# Patient Record
Sex: MALE | Race: WHITE | NOT HISPANIC OR LATINO
[De-identification: names, ages, dates, MRNs, and addresses within clinical notes are randomized per-mention and may not be internally consistent; named-entity substitution may affect disease eponyms.]

---

## 2020-01-23 ENCOUNTER — APPOINTMENT (OUTPATIENT)
Dept: PLASTIC SURGERY | Facility: CLINIC | Age: 55
End: 2020-01-23
Payer: MEDICARE

## 2020-01-23 VITALS — WEIGHT: 205 LBS | BODY MASS INDEX: 30.36 KG/M2 | HEIGHT: 69 IN

## 2020-01-23 DIAGNOSIS — Z86.79 PERSONAL HISTORY OF OTHER DISEASES OF THE CIRCULATORY SYSTEM: ICD-10-CM

## 2020-01-23 DIAGNOSIS — M24.542 CONTRACTURE, LEFT HAND: ICD-10-CM

## 2020-01-23 DIAGNOSIS — G40.909 EPILEPSY, UNSPECIFIED, NOT INTRACTABLE, W/OUT STATUS EPILEPTICUS: ICD-10-CM

## 2020-01-23 DIAGNOSIS — Z86.39 PERSONAL HISTORY OF OTHER ENDOCRINE, NUTRITIONAL AND METABOLIC DISEASE: ICD-10-CM

## 2020-01-23 DIAGNOSIS — Z78.9 OTHER SPECIFIED HEALTH STATUS: ICD-10-CM

## 2020-01-23 DIAGNOSIS — Z86.59 PERSONAL HISTORY OF OTHER MENTAL AND BEHAVIORAL DISORDERS: ICD-10-CM

## 2020-01-23 DIAGNOSIS — F31.70 BIPOLAR DISORDER, CURRENTLY IN REMISSION, MOST RECENT EPISODE UNSPECIFIED: ICD-10-CM

## 2020-01-23 PROCEDURE — 99203 OFFICE O/P NEW LOW 30 MIN: CPT

## 2020-01-23 RX ORDER — LISINOPRIL 2.5 MG/1
2.5 TABLET ORAL
Refills: 0 | Status: ACTIVE | COMMUNITY

## 2020-01-23 RX ORDER — SERTRALINE HYDROCHLORIDE 50 MG/1
50 TABLET, FILM COATED ORAL
Refills: 0 | Status: ACTIVE | COMMUNITY

## 2020-01-23 RX ORDER — ATORVASTATIN CALCIUM 20 MG/1
20 TABLET, FILM COATED ORAL
Refills: 0 | Status: ACTIVE | COMMUNITY

## 2020-01-23 RX ORDER — RISPERIDONE 4 MG/1
4 TABLET, ORALLY DISINTEGRATING ORAL
Refills: 0 | Status: ACTIVE | COMMUNITY

## 2020-01-23 RX ORDER — HALOPERIDOL DECANOATE 100 MG/ML
100 INJECTION INTRAMUSCULAR
Refills: 0 | Status: ACTIVE | COMMUNITY

## 2020-01-23 RX ORDER — BENZTROPINE MESYLATE 2 MG/1
2 TABLET ORAL
Refills: 0 | Status: ACTIVE | COMMUNITY

## 2020-01-23 RX ORDER — BISACODYL 5 MG/1
5 TABLET, DELAYED RELEASE ORAL
Refills: 0 | Status: ACTIVE | COMMUNITY

## 2020-01-23 RX ORDER — TOPIRAMATE 25 MG/1
25 TABLET, FILM COATED ORAL
Refills: 0 | Status: ACTIVE | COMMUNITY

## 2020-01-23 RX ORDER — FENOFIBRATE 120 MG/1
TABLET ORAL
Refills: 0 | Status: ACTIVE | COMMUNITY

## 2020-01-23 RX ORDER — LEVOTHYROXINE SODIUM 0.1 MG/1
100 TABLET ORAL
Refills: 0 | Status: ACTIVE | COMMUNITY

## 2020-01-23 NOTE — PHYSICAL EXAM
[de-identified] : well-developed male, NAD [de-identified] : NC/AT [de-identified] : PERRL [de-identified] : supple [de-identified] : good inspiratory effort  [de-identified] : DAVIDR [de-identified] : soft, nontender  [de-identified] : Left hand with significant flexion contractures at MP/PIP joints of digits 4 and 5, positive tabletop test, no palmar nodules or fibrosis, no open wounds or sensory deficits

## 2020-01-23 NOTE — HISTORY OF PRESENT ILLNESS
[FreeTextEntry1] : 53 yo M with PMH of HTN, Bipolar, Schizoaffective disorder, Epilepsy and Hypothyroid who is a resident at [facility] and presents today for evaluation of hand contracture. Patient is RHD and presents c/o left hand contracture mainly digits 4 and 5 of unknown duration. No prior hand studies, injections or procedure. Denies any trauma, finger numbness or tingling. \par \par disabled

## 2020-02-13 ENCOUNTER — APPOINTMENT (OUTPATIENT)
Dept: PLASTIC SURGERY | Facility: CLINIC | Age: 55
End: 2020-02-13
Payer: MEDICARE

## 2020-02-13 PROCEDURE — 99212 OFFICE O/P EST SF 10 MIN: CPT

## 2020-02-13 NOTE — ASSESSMENT
[FreeTextEntry1] : Reason For Visit\par THERESA GOLDBERG is being seen for an initial evaluation. \par Patient accompanied by health aid. \par  \par History of Present Illness\par 55 yo M with PMH of HTN, Bipolar, Schizoaffective disorder, Epilepsy and Hypothyroid who is a resident at [facility] and presents today for evaluation of hand contracture. Patient is RHD and presents c/o left hand contracture mainly digits 4 and 5 of unknown duration. No prior hand studies, injections or procedure. Denies any trauma, finger numbness or tingling. \par \par disabled \par  \par Past Medical History\par History of Epilepsy (345.90) (G40.909)\par History of depressed bipolar disorder (296.56) (F31.70)\par History of hypertension (V12.59) (Z86.79)\par History of hypothyroidism (V12.29) (Z86.39)\par History of schizoaffective disorder (V11.0) (Z86.59)\par \par Surgical History\par No history of surgery\par \par Social History\par No alcohol use\par No illicit drug use\par Non-smoker (V49.89) (Z78.9)\par \par Allergies\par No Known Drug Allergies\par \par Review of Systems\par \par Musculoskeletal: as noted in HPI. \par Psychiatric: as noted in HPI. \par Constitutional, Eyes, ENT, Cardiovascular, Respiratory, Gastrointestinal, Genitourinary, Integumentary, Neurological, Endocrine and Heme/Lymph are otherwise negative. \par  \par Vitals\par Vital Signs \par 	Recorded: 23Jan2020 10:30AM\par Height	5 ft 9 in\par Weight	205 lb \par BMI Calculated	30.27\par BSA Calculated	2.09\par \par Physical Exam\par \par Constitutional: well-developed male, NAD \par Head: NC/AT \par Eyes: PERRL \par Neck: supple \par Pulmonary: good inspiratory effort \par Cardiovascular: RRR \par Gastrointestinal: soft, nontender \par Extremities: Left hand with significant flexion contractures at MP/PIP joints of digits 4 and 5, positive tabletop test, no palmar nodules or fibrosis, no open wounds or sensory deficits \par  \par Assessment\par Contracture of joint of left hand (718.44) (M24.542)\par \par 55 yo M with left hand Dupuytren's contracture of digits 4 and 5. \par \par - hand X-Ray\par - f/u after study to discuss surgical release and treatment plan with Dr. Shrestha. \par \par \par \par as above\par xrays reviewed\par left hand contracture 4+5\par most c/w ulnar neuropathy\par \par suggest B/L UE EMG/EDS\par \par f/u thereafter\par NOT Dupuytrens

## 2020-08-18 ENCOUNTER — APPOINTMENT (OUTPATIENT)
Dept: PLASTIC SURGERY | Facility: CLINIC | Age: 55
End: 2020-08-18

## 2020-09-18 ENCOUNTER — APPOINTMENT (OUTPATIENT)
Dept: PLASTIC SURGERY | Facility: CLINIC | Age: 55
End: 2020-09-18
Payer: MEDICARE

## 2020-09-18 PROCEDURE — 99212 OFFICE O/P EST SF 10 MIN: CPT

## 2020-09-18 NOTE — ASSESSMENT
[FreeTextEntry1] : Reason For Visit\par THERESA GOLDBERG is being seen for an initial evaluation. \par Patient accompanied by health aid. \par  \par History of Present Illness\par 55 yo M with PMH of HTN, Bipolar, Schizoaffective disorder, Epilepsy and Hypothyroid who is a resident at [facility] and presents today for evaluation of hand contracture. Patient is RHD and presents c/o left hand contracture mainly digits 4 and 5 of unknown duration. No prior hand studies, injections or procedure. Denies any trauma, finger numbness or tingling. \par \par disabled \par  \par Past Medical History\par History of Epilepsy (345.90) (G40.909)\par History of depressed bipolar disorder (296.56) (F31.70)\par History of hypertension (V12.59) (Z86.79)\par History of hypothyroidism (V12.29) (Z86.39)\par History of schizoaffective disorder (V11.0) (Z86.59)\par \par Surgical History\par No history of surgery\par \par Social History\par No alcohol use\par No illicit drug use\par Non-smoker (V49.89) (Z78.9)\par \par Allergies\par No Known Drug Allergies\par \par Review of Systems\par \par Musculoskeletal: as noted in HPI. \par Psychiatric: as noted in HPI. \par Constitutional, Eyes, ENT, Cardiovascular, Respiratory, Gastrointestinal, Genitourinary, Integumentary, Neurological, Endocrine and Heme/Lymph are otherwise negative. \par  \par Vitals\par Vital Signs \par 	Recorded: 23Jan2020 10:30AM\par Height	5 ft 9 in\par Weight	205 lb \par BMI Calculated	30.27\par BSA Calculated	2.09\par \par Physical Exam\par \par Constitutional: well-developed male, NAD \par Head: NC/AT \par Eyes: PERRL \par Neck: supple \par Pulmonary: good inspiratory effort \par Cardiovascular: RRR \par Gastrointestinal: soft, nontender \par Extremities: Left hand with significant flexion contractures at MP/PIP joints of digits 4 and 5, positive tabletop test, no palmar nodules or fibrosis, no open wounds or sensory deficits \par  \par Assessment\par Contracture of joint of left hand (718.44) (M24.542)\par \par 55 yo M with left hand Dupuytren's contracture of digits 4 and 5. \par \par - hand X-Ray\par - f/u after study to discuss surgical release and treatment plan with Dr. Shrestha. \par \par \par \par as above\par xrays reviewed\par left hand contracture 4+5\par most c/w ulnar neuropathy\par \par suggest B/L UE EMG/EDS\par \par f/u thereafter\par NOT Dupuytrens\par \par as above\par EMG reviewed:  severe ulnar neuropathy at bilateral elbows\par \par needs B/L cubital tunnel release\par \par Regarding the hand surgery, we discussed the risk of bleeding, infection, need for additional unplanned surgery, need for postoperative hand occupational therapy, possible lack of improvement and diminished hand function.  We discussed prolonged recovery.  All questions were answered and all risks were well understood by the patient.\par \par Due to severe deficit already present the benefits from surgery will primarily be prevention of worsening of sxs.  Small chance of meaningful improvement in ulnar motor wekaness of hands, including clawing\par Will nee Hand OT and splinting thereafter.

## 2020-10-09 NOTE — ASU PATIENT PROFILE, ADULT - VISION (WITH CORRECTIVE LENSES IF THE PATIENT USUALLY WEARS THEM):
For insurance purposes, please a submit a service to [facility] dated 2019. Patient is scheduled with Corinne Taveras on 1/14/19. Any questions please call our office at [phone].  Thank you!  
Updated service to Nutrition entered.   
Normal vision: sees adequately in most situations; can see medication labels, newsprint

## 2020-10-12 ENCOUNTER — APPOINTMENT (OUTPATIENT)
Dept: PLASTIC SURGERY | Facility: AMBULATORY SURGERY CENTER | Age: 55
End: 2020-10-12
Payer: MEDICARE

## 2020-10-12 ENCOUNTER — OUTPATIENT (OUTPATIENT)
Dept: OUTPATIENT SERVICES | Facility: HOSPITAL | Age: 55
LOS: 1 days | Discharge: HOME | End: 2020-10-12

## 2020-10-12 VITALS
DIASTOLIC BLOOD PRESSURE: 70 MMHG | TEMPERATURE: 98 F | RESPIRATION RATE: 16 BRPM | OXYGEN SATURATION: 99 % | HEART RATE: 70 BPM | SYSTOLIC BLOOD PRESSURE: 136 MMHG

## 2020-10-12 VITALS
RESPIRATION RATE: 18 BRPM | OXYGEN SATURATION: 100 % | HEIGHT: 69 IN | TEMPERATURE: 98 F | DIASTOLIC BLOOD PRESSURE: 70 MMHG | SYSTOLIC BLOOD PRESSURE: 116 MMHG | WEIGHT: 209 LBS | HEART RATE: 75 BPM

## 2020-10-12 PROCEDURE — 64718 REVISE ULNAR NERVE AT ELBOW: CPT | Mod: 50

## 2020-10-12 RX ORDER — BENZTROPINE MESYLATE 1 MG
1 TABLET ORAL
Qty: 0 | Refills: 0 | DISCHARGE

## 2020-10-12 RX ORDER — MAGNESIUM HYDROXIDE 400 MG/1
0 TABLET, CHEWABLE ORAL
Qty: 0 | Refills: 0 | DISCHARGE

## 2020-10-12 RX ORDER — RISPERIDONE 4 MG/1
1 TABLET ORAL
Qty: 0 | Refills: 0 | DISCHARGE

## 2020-10-12 RX ORDER — TOPIRAMATE 25 MG
1 TABLET ORAL
Qty: 0 | Refills: 0 | DISCHARGE

## 2020-10-12 RX ORDER — SODIUM CHLORIDE 9 MG/ML
1000 INJECTION, SOLUTION INTRAVENOUS
Refills: 0 | Status: DISCONTINUED | OUTPATIENT
Start: 2020-10-12 | End: 2020-10-26

## 2020-10-12 RX ORDER — TRAMADOL HYDROCHLORIDE 50 MG/1
50 TABLET, COATED ORAL
Qty: 10 | Refills: 0 | Status: ACTIVE | COMMUNITY
Start: 2020-10-12 | End: 1900-01-01

## 2020-10-12 RX ORDER — LISINOPRIL 2.5 MG/1
1 TABLET ORAL
Qty: 0 | Refills: 0 | DISCHARGE

## 2020-10-12 RX ORDER — TRAMADOL HYDROCHLORIDE 50 MG/1
1 TABLET ORAL
Qty: 10 | Refills: 0
Start: 2020-10-12 | End: 2020-10-14

## 2020-10-12 RX ORDER — ONDANSETRON 8 MG/1
4 TABLET, FILM COATED ORAL ONCE
Refills: 0 | Status: DISCONTINUED | OUTPATIENT
Start: 2020-10-12 | End: 2020-10-26

## 2020-10-12 RX ORDER — OXYCODONE AND ACETAMINOPHEN 5; 325 MG/1; MG/1
1 TABLET ORAL EVERY 4 HOURS
Refills: 0 | Status: DISCONTINUED | OUTPATIENT
Start: 2020-10-12 | End: 2020-10-12

## 2020-10-12 RX ORDER — MOXIFLOXACIN HYDROCHLORIDE TABLETS, 400 MG 400 MG/1
1 TABLET, FILM COATED ORAL
Qty: 0 | Refills: 0 | DISCHARGE

## 2020-10-12 RX ORDER — CEPHALEXIN 500 MG/1
500 CAPSULE ORAL 4 TIMES DAILY
Qty: 12 | Refills: 0 | Status: ACTIVE | COMMUNITY
Start: 2020-10-12 | End: 1900-01-01

## 2020-10-12 RX ORDER — HALOPERIDOL DECANOATE 100 MG/ML
0 INJECTION INTRAMUSCULAR
Qty: 0 | Refills: 0 | DISCHARGE

## 2020-10-12 RX ORDER — FENOFIBRATE,MICRONIZED 130 MG
1 CAPSULE ORAL
Qty: 0 | Refills: 0 | DISCHARGE

## 2020-10-12 RX ORDER — LEVOTHYROXINE SODIUM 125 MCG
1 TABLET ORAL
Qty: 0 | Refills: 0 | DISCHARGE

## 2020-10-12 RX ORDER — HYDROMORPHONE HYDROCHLORIDE 2 MG/ML
0.5 INJECTION INTRAMUSCULAR; INTRAVENOUS; SUBCUTANEOUS
Refills: 0 | Status: DISCONTINUED | OUTPATIENT
Start: 2020-10-12 | End: 2020-10-12

## 2020-10-12 RX ORDER — CEPHALEXIN 500 MG
1 CAPSULE ORAL
Qty: 12 | Refills: 0
Start: 2020-10-12 | End: 2020-10-14

## 2020-10-12 RX ORDER — ATORVASTATIN CALCIUM 80 MG/1
1 TABLET, FILM COATED ORAL
Qty: 0 | Refills: 0 | DISCHARGE

## 2020-10-12 RX ORDER — SERTRALINE 25 MG/1
1 TABLET, FILM COATED ORAL
Qty: 0 | Refills: 0 | DISCHARGE

## 2020-10-12 RX ORDER — ACETAMINOPHEN 500 MG
0 TABLET ORAL
Qty: 0 | Refills: 0 | DISCHARGE

## 2020-10-12 RX ADMIN — SODIUM CHLORIDE 100 MILLILITER(S): 9 INJECTION, SOLUTION INTRAVENOUS at 11:59

## 2020-10-12 NOTE — ASU DISCHARGE PLAN (ADULT/PEDIATRIC) - CALL YOUR DOCTOR IF YOU HAVE ANY OF THE FOLLOWING:
Numbness, tingling, color or temperature change to extremity/Pain not relieved by Medications/Fever greater than (need to indicate Fahrenheit or Celsius)/Swelling that gets worse/Bleeding that does not stop

## 2020-10-12 NOTE — BRIEF OPERATIVE NOTE - NSEVIDENCEINFORABS_GEN_ALL_CORE
Spinal    Diagnosis: R HIP  Patient location during procedure: holding area  Start time: 5/21/2020 10:06 AM  Timeout: 5/21/2020 10:06 AM  End time: 5/21/2020 10:10 AM    Staffing  Authorizing Provider: Avila Bullard MD  Performing Provider: Avila Bullard MD    Preanesthetic Checklist  Completed: patient identified, site marked, surgical consent, pre-op evaluation, timeout performed, IV checked, risks and benefits discussed and monitors and equipment checked  Spinal Block  Patient position: sitting  Prep: ChloraPrep  Patient monitoring: heart rate, cardiac monitor, continuous pulse ox and frequent blood pressure checks  Approach: right paramedian  Location: L3-4  Injection technique: single shot  CSF Fluid: clear free-flowing CSF  Needle  Needle type: pencil-tip   Needle gauge: 24 G  Needle length: 3.5 in  Additional Documentation: incremental injection, negative aspiration for heme and no paresthesia on injection  Needle localization: anatomical landmarks  Assessment  Sensory level: T6   Dermatomal levels determined by alcohol wipe and pinch or prick  Ease of block: easy  Patient's tolerance of the procedure: comfortable throughout block and no complaints          
No

## 2020-10-12 NOTE — CHART NOTE - NSCHARTNOTEFT_GEN_A_CORE
PACU ANESTHESIA ADMISSION NOTE      ____  Intubated  TV:______       Rate: ______      FiO2: ______    __x__  Patent Airway    __x__  Full return of protective reflexes    __x__  Full recovery from anesthesia / back to baseline status    Vitals:  T(C): 36.7 (10-12-20 @ 10:34), Max: 36.7 (10-12-20 @ 10:10)  HR: 75 (10-12-20 @ 10:34) (75 - 75)  BP: 116/70 (10-12-20 @ 10:34) (116/70 - 116/70)  RR: 18 (10-12-20 @ 10:34) (18 - 18)  SpO2: 100% (10-12-20 @ 10:34) (100% - 100%)    Mental Status:  __x__ Awake   ___x__ Alert   _____ Drowsy   _____ Sedated    Nausea/Vomiting:  __x__ NO  ______Yes,   See Post - Op Orders          Pain Scale (0-10):  __0___    Treatment: ____ None    __x__ See Post - Op/PCA Orders    Post - Operative Fluids:   ____ Oral   __x__ See Post - Op Orders    Plan: Discharge:   _x___Home       _____Floor     _____Critical Care    _____  Other:_________________    Comments: Patient had smooth intraoperative event, no anesthesia complication.  PACU Vital signs: HR:  67          BP:  117      / 58         RR: 16            O2 Sat:  97     %     Temp 36

## 2020-10-12 NOTE — ASU DISCHARGE PLAN (ADULT/PEDIATRIC) - ASU DC SPECIAL INSTRUCTIONSFT
Keep surgical site clean and dry.   Do not remove any dressings or get them wet.   Rest, no lifting.

## 2020-10-12 NOTE — ASU DISCHARGE PLAN (ADULT/PEDIATRIC) - PAIN MANAGEMENT
Keflex, Tramadol for pain as needed/Prescriptions electronically submitted to pharmacy from [facility]

## 2020-10-15 DIAGNOSIS — G40.909 EPILEPSY, UNSPECIFIED, NOT INTRACTABLE, WITHOUT STATUS EPILEPTICUS: ICD-10-CM

## 2020-10-15 DIAGNOSIS — E03.9 HYPOTHYROIDISM, UNSPECIFIED: ICD-10-CM

## 2020-10-15 DIAGNOSIS — F31.9 BIPOLAR DISORDER, UNSPECIFIED: ICD-10-CM

## 2020-10-15 DIAGNOSIS — E78.00 PURE HYPERCHOLESTEROLEMIA, UNSPECIFIED: ICD-10-CM

## 2020-10-15 DIAGNOSIS — I10 ESSENTIAL (PRIMARY) HYPERTENSION: ICD-10-CM

## 2020-10-15 DIAGNOSIS — F03.90 UNSPECIFIED DEMENTIA WITHOUT BEHAVIORAL DISTURBANCE: ICD-10-CM

## 2020-10-15 DIAGNOSIS — G56.23 LESION OF ULNAR NERVE, BILATERAL UPPER LIMBS: ICD-10-CM

## 2020-10-19 ENCOUNTER — APPOINTMENT (OUTPATIENT)
Dept: PLASTIC SURGERY | Facility: CLINIC | Age: 55
End: 2020-10-19
Payer: MEDICARE

## 2020-10-19 PROBLEM — F32.9 MAJOR DEPRESSIVE DISORDER, SINGLE EPISODE, UNSPECIFIED: Chronic | Status: ACTIVE | Noted: 2020-10-12

## 2020-10-19 PROBLEM — E78.00 PURE HYPERCHOLESTEROLEMIA, UNSPECIFIED: Chronic | Status: ACTIVE | Noted: 2020-10-12

## 2020-10-19 PROBLEM — R13.10 DYSPHAGIA, UNSPECIFIED: Chronic | Status: ACTIVE | Noted: 2020-10-12

## 2020-10-19 PROBLEM — F41.9 ANXIETY DISORDER, UNSPECIFIED: Chronic | Status: ACTIVE | Noted: 2020-10-12

## 2020-10-19 PROBLEM — F31.9 BIPOLAR DISORDER, UNSPECIFIED: Chronic | Status: ACTIVE | Noted: 2020-10-12

## 2020-10-19 PROBLEM — F03.90 UNSPECIFIED DEMENTIA, UNSPECIFIED SEVERITY, WITHOUT BEHAVIORAL DISTURBANCE, PSYCHOTIC DISTURBANCE, MOOD DISTURBANCE, AND ANXIETY: Chronic | Status: ACTIVE | Noted: 2020-10-12

## 2020-10-19 PROBLEM — J18.9 PNEUMONIA, UNSPECIFIED ORGANISM: Chronic | Status: ACTIVE | Noted: 2020-10-12

## 2020-10-19 PROBLEM — I10 ESSENTIAL (PRIMARY) HYPERTENSION: Chronic | Status: ACTIVE | Noted: 2020-10-12

## 2020-10-19 PROBLEM — G40.909 EPILEPSY, UNSPECIFIED, NOT INTRACTABLE, WITHOUT STATUS EPILEPTICUS: Chronic | Status: ACTIVE | Noted: 2020-10-12

## 2020-10-19 PROBLEM — G93.41 METABOLIC ENCEPHALOPATHY: Chronic | Status: ACTIVE | Noted: 2020-10-12

## 2020-10-19 PROBLEM — E03.9 HYPOTHYROIDISM, UNSPECIFIED: Chronic | Status: ACTIVE | Noted: 2020-10-12

## 2020-10-19 PROBLEM — H26.9 UNSPECIFIED CATARACT: Chronic | Status: ACTIVE | Noted: 2020-10-12

## 2020-10-19 PROCEDURE — 99024 POSTOP FOLLOW-UP VISIT: CPT

## 2020-10-19 NOTE — ASSESSMENT
[FreeTextEntry1] : 55 yo M with left hand contracture of digits 4 and 5 and ulnar neuropathy at elbow now POD#7 s/p bl release of cubital tunnel. Doing well. \par \par - dressings changed \par - post-op instructions discussed\par - all questions answered \par - f/u next week for suture removal \par \par

## 2020-10-19 NOTE — HISTORY OF PRESENT ILLNESS
[FreeTextEntry1] : 53 yo M with PMH of HTN, Bipolar, Schizoaffective disorder, Epilepsy and Hypothyroid who is a resident at [facility] and presents today for evaluation of hand contracture. Patient is RHD and presents c/o left hand contracture mainly digits 4 and 5 of unknown duration. No prior hand studies, injections or procedure. Denies any trauma, finger numbness or tingling. \par \par disabled\par \par Interval hx (2/13/20). Patient seen for f/u and review of X-Rays. \par \par Interval hx (9/18/20). Patient presents for f/u to discuss EMG results. \par \par Interval hx (10/19/20). Patient presents today POD#7 s/p release of bilateral cubital tunnel. Doing well with no significant complaints. Denies any fever, chills or bleeding.

## 2020-10-19 NOTE — PHYSICAL EXAM
[de-identified] : well-developed male, NAD [de-identified] : Bilateral medial elbow incisions healing well, c/d/i, no cellulitis or fluid collection\par Left hand with significant flexion contractures at MP/PIP joints of digits 4 and 5, positive tabletop test, no palmar nodules or fibrosis,

## 2020-10-19 NOTE — REASON FOR VISIT
[Post Op: _________] : a [unfilled] post op visit [Other: _____] : [unfilled] [Source: ______] : History obtained from [unfilled]

## 2020-10-26 ENCOUNTER — APPOINTMENT (OUTPATIENT)
Dept: PLASTIC SURGERY | Facility: CLINIC | Age: 55
End: 2020-10-26
Payer: MEDICARE

## 2020-10-26 DIAGNOSIS — G56.20 LESION OF ULNAR NERVE, UNSPECIFIED UPPER LIMB: ICD-10-CM

## 2020-10-26 PROCEDURE — 99024 POSTOP FOLLOW-UP VISIT: CPT

## 2020-10-26 NOTE — PHYSICAL EXAM
[de-identified] : well-developed male, NAD [de-identified] : Bilateral medial elbow incisions healing well, c/d/i, no cellulitis or fluid collection\par Left hand with significant flexion contractures at MP/PIP joints of digits 4 and 5, positive tabletop test, no palmar nodules or fibrosis,

## 2020-10-26 NOTE — ASSESSMENT
[FreeTextEntry1] : 53 yo M with left hand contracture of digits 4 and 5 and ulnar neuropathy at elbow now POD#14 s/p bl release of cubital tunnel. Doing well. \par \par - sutures removed\par - steri strips applied\par - OT rx given\par - f/u 6 weeks

## 2020-10-26 NOTE — HISTORY OF PRESENT ILLNESS
[FreeTextEntry1] : 55 yo M with PMH of HTN, Bipolar, Schizoaffective disorder, Epilepsy and Hypothyroid who is a resident at [facility] and presents today for evaluation of hand contracture. Patient is RHD and presents c/o left hand contracture mainly digits 4 and 5 of unknown duration. No prior hand studies, injections or procedure. Denies any trauma, finger numbness or tingling. \par \par disabled\par \par Interval hx (2/13/20). Patient seen for f/u and review of X-Rays. \par \par Interval hx (9/18/20). Patient presents for f/u to discuss EMG results. \par \par Interval hx (10/19/20). Patient presents today POD#7 s/p release of bilateral cubital tunnel. Doing well with no significant complaints. Denies any fever, chills or bleeding. \par \par Interval hx (10/26/20): Patient presents today POD#14 s/p release of bilateral cubital tunnel. Doing well with no significant complaints. Denies any fever, chills or bleeding.

## 2020-12-08 ENCOUNTER — APPOINTMENT (OUTPATIENT)
Dept: PLASTIC SURGERY | Facility: CLINIC | Age: 55
End: 2020-12-08
Payer: MEDICARE

## 2020-12-08 ENCOUNTER — OUTPATIENT (OUTPATIENT)
Dept: OUTPATIENT SERVICES | Facility: HOSPITAL | Age: 55
LOS: 1 days | Discharge: HOME | End: 2020-12-08

## 2020-12-08 DIAGNOSIS — G56.20 LESION OF ULNAR NERVE, UNSPECIFIED UPPER LIMB: ICD-10-CM

## 2021-03-04 ENCOUNTER — APPOINTMENT (OUTPATIENT)
Dept: PLASTIC SURGERY | Facility: CLINIC | Age: 56
End: 2021-03-04
Payer: MEDICARE

## 2021-03-04 PROCEDURE — 99212 OFFICE O/P EST SF 10 MIN: CPT

## 2021-03-04 NOTE — PHYSICAL EXAM
[de-identified] : well-developed male, NAD [de-identified] : Bilateral medial elbow incisions healing well, c/d/i, no cellulitis or fluid collection\par Left hand with significant flexion contractures at MP/PIP joints of digits 4 and 5, positive tabletop test, no palmar nodules or fibrosis,

## 2021-03-04 NOTE — ASSESSMENT
[FreeTextEntry1] : 53 yo M with left hand contracture of digits 4 and 5 and ulnar neuropathy at elbow now POD#14 s/p bl release of cubital tunnel. Doing well. \par \par - sutures removed\par - steri strips applied\par - OT rx given\par - f/u 6 weeks\par \par as above\par doing well overall s/p  decompression B/L ulnar nerve at elbows\par needs splint to prevent worsening ulnar contracture of left hand\par script given and reviewed w aid\par \par f/u in 6 months

## 2021-03-04 NOTE — HISTORY OF PRESENT ILLNESS
[FreeTextEntry1] : 53 yo M with PMH of HTN, Bipolar, Schizoaffective disorder, Epilepsy and Hypothyroid who is a resident at [facility] and presents today for evaluation of hand contracture. Patient is RHD and presents c/o left hand contracture mainly digits 4 and 5 of unknown duration. No prior hand studies, injections or procedure. Denies any trauma, finger numbness or tingling. \par \par disabled\par \par Interval hx (2/13/20). Patient seen for f/u and review of X-Rays. \par \par Interval hx (9/18/20). Patient presents for f/u to discuss EMG results. \par \par Interval hx (10/19/20). Patient presents today POD#7 s/p release of bilateral cubital tunnel. Doing well with no significant complaints. Denies any fever, chills or bleeding. \par \par Interval hx (10/26/20): Patient presents today POD#14 s/p release of bilateral cubital tunnel. Doing well with no significant complaints. Denies any fever, chills or bleeding.

## 2021-09-02 ENCOUNTER — APPOINTMENT (OUTPATIENT)
Dept: PLASTIC SURGERY | Facility: CLINIC | Age: 56
End: 2021-09-02
Payer: MEDICARE

## 2021-09-02 PROCEDURE — 99212 OFFICE O/P EST SF 10 MIN: CPT

## 2021-09-02 NOTE — ASSESSMENT
[FreeTextEntry1] : 53 yo M with left hand contracture of digits 4 and 5 and ulnar neuropathy at elbow now POD#14 s/p bl release of cubital tunnel. Doing well. \par \par - sutures removed\par - steri strips applied\par - OT rx given\par - f/u 6 weeks\par \par as above\par doing well overall s/p  decompression B/L ulnar nerve at elbows\par needs splint to prevent worsening ulnar contracture of left hand\par script given and reviewed w aid\par \par f/u in 6 months\par \par 9/2/2021\par as above\par Due to COVID 19, pre-visit patient instructions were explained to the patient and their symptoms were checked upon arrival.  \par Masks were used by the health care providers and staff and the examination room was cleaned after the patient visit was completed.\par \par no issues\par s/p B/L release ulnar nerve at elbow--had sever ulnar nerve compression at elbow\par \par marginal improvement (expected given severe nature of compression)\par \par no f/u indicated

## 2021-09-02 NOTE — HISTORY OF PRESENT ILLNESS
[FreeTextEntry1] : 53 yo M with PMH of HTN, Bipolar, Schizoaffective disorder, Epilepsy and Hypothyroid who is a resident at [facility] and presents today for evaluation of hand contracture. Patient is RHD and presents c/o left hand contracture mainly digits 4 and 5 of unknown duration. No prior hand studies, injections or procedure. Denies any trauma, finger numbness or tingling. \par \par disabled\par \par Interval hx (2/13/20). Patient seen for f/u and review of X-Rays. \par \par Interval hx (9/18/20). Patient presents for f/u to discuss EMG results. \par \par Interval hx (10/19/20). Patient presents today POD#7 s/p release of bilateral cubital tunnel. Doing well with no significant complaints. Denies any fever, chills or bleeding. \par \par Interval hx (10/26/20): Patient presents today POD#14 s/p release of bilateral cubital tunnel. Doing well with no significant complaints. Denies any fever, chills or bleeding. \par \par Interval hx (9/2/21): Pt presents today 11 months s/p release of bilateral cubital tunnel. Has not been wearing left hand splint- states he forgot. Called recall if he went to therapy. Denies pain or paraesthesias but c/o difficulty extending left 4th and 5th digits.

## 2021-09-02 NOTE — PHYSICAL EXAM
[de-identified] : well-developed male, NAD [de-identified] : Bilateral medial elbow incisions healing well, c/d/i, no cellulitis or fluid collection\par Left hand with significant flexion contractures at MP/PIP joints of digits 4 and 5, positive tabletop test, no palmar nodules or fibrosis,
